# Patient Record
Sex: MALE | Race: WHITE | URBAN - METROPOLITAN AREA
[De-identification: names, ages, dates, MRNs, and addresses within clinical notes are randomized per-mention and may not be internally consistent; named-entity substitution may affect disease eponyms.]

---

## 2018-04-04 ENCOUNTER — TRANSFERRED RECORDS (OUTPATIENT)
Dept: HEALTH INFORMATION MANAGEMENT | Facility: CLINIC | Age: 23
End: 2018-04-04

## 2018-04-06 ENCOUNTER — TRANSFERRED RECORDS (OUTPATIENT)
Dept: HEALTH INFORMATION MANAGEMENT | Facility: CLINIC | Age: 23
End: 2018-04-06

## 2018-08-06 ENCOUNTER — OFFICE VISIT (OUTPATIENT)
Dept: SLEEP MEDICINE | Facility: CLINIC | Age: 23
End: 2018-08-06
Payer: COMMERCIAL

## 2018-08-06 VITALS
WEIGHT: 188 LBS | BODY MASS INDEX: 26.92 KG/M2 | RESPIRATION RATE: 16 BRPM | DIASTOLIC BLOOD PRESSURE: 85 MMHG | SYSTOLIC BLOOD PRESSURE: 135 MMHG | TEMPERATURE: 98.2 F | HEIGHT: 70 IN | HEART RATE: 60 BPM | OXYGEN SATURATION: 100 %

## 2018-08-06 DIAGNOSIS — G47.411 NARCOLEPSY WITH CATAPLEXY: Primary | ICD-10-CM

## 2018-08-06 PROCEDURE — 99204 OFFICE O/P NEW MOD 45 MIN: CPT | Performed by: INTERNAL MEDICINE

## 2018-08-06 RX ORDER — MODAFINIL 200 MG/1
200 TABLET ORAL DAILY
Qty: 30 TABLET | Refills: 2 | Status: SHIPPED | OUTPATIENT
Start: 2018-08-06 | End: 2018-09-06

## 2018-08-06 NOTE — NURSING NOTE
"Chief Complaint   Patient presents with     Sleep Problem     Establish care, MILAGROS       Initial /85  Pulse 60  Temp 98.2  F (36.8  C) (Oral)  Resp 16  Ht 1.778 m (5' 10\")  Wt 85.3 kg (188 lb)  SpO2 100%  BMI 26.98 kg/m2 Estimated body mass index is 26.98 kg/(m^2) as calculated from the following:    Height as of this encounter: 1.778 m (5' 10\").    Weight as of this encounter: 85.3 kg (188 lb).    Medication Reconciliation: complete    Neck circumference: 15 inches / 38 centimeters.    ESS 16    Aurea Dennis MA      "

## 2018-08-06 NOTE — MR AVS SNAPSHOT
After Visit Summary   8/6/2018    Fernando Tejeda    MRN: [id number]           Patient Information     Date Of Birth          1995        Visit Information        Provider Department      8/6/2018 3:30 PM Trent Van MD [facility]        Today's Diagnoses     Narcolepsy with cataplexy    -  1      Care Instructions      Your BMI is Body mass index is 26.98 kg/(m^2).  Weight management is a personal decision.  If you are interested in exploring weight loss strategies, the following discussion covers the approaches that may be successful. Body mass index (BMI) is one way to tell whether you are at a healthy weight, overweight, or obese. It measures your weight in relation to your height.  A BMI of 18.5 to 24.9 is in the healthy range. A person with a BMI of 25 to 29.9 is considered overweight, and someone with a BMI of 30 or greater is considered obese. More than two-thirds of American adults are considered overweight or obese.  Being overweight or obese increases the risk for further weight gain. Excess weight may lead to heart disease and diabetes.  Creating and following plans for healthy eating and physical activity may help you improve your health.  Weight control is part of healthy lifestyle and includes exercise, emotional health, and healthy eating habits. Careful eating habits lifelong are the mainstay of weight control. Though there are significant health benefits from weight loss, long-term weight loss with diet alone may be very difficult to achieve- studies show long-term success with dietary management in less than 10% of people. Attaining a healthy weight may be especially difficult to achieve in those with severe obesity. In some cases, medications, devices and surgical management might be considered.  What can you do?  If you are overweight or obese and are interested in methods for weight loss, you should discuss this with your provider.     Consider reducing  daily calorie intake by 500 calories.     Keep a food journal.     Avoiding skipping meals, consider cutting portions instead.    Diet combined with exercise helps maintain muscle while optimizing fat loss. Strength training is particularly important for building and maintaining muscle mass. Exercise helps reduce stress, increase energy, and improves fitness. Increasing exercise without diet control, however, may not burn enough calories to loose weight.       Start walking three days a week 10-20 minutes at a time    Work towards walking thirty minutes five days a week     Eventually, increase the speed of your walking for 1-2 minutes at time    In addition, we recommend that you review healthy lifestyles and methods for weight loss available through the National Institutes of Health patient information sites:  http://win.niddk.nih.gov/publications/index.htm    And look into health and wellness programs that may be available through your health insurance provider, employer, local community center, or evelia club.    Weight management plan: Patient was referred to their PCP to discuss a diet and exercise plan.              Follow-ups after your visit        Follow-up notes from your care team     Return in about 3 months (around 11/6/2018) for narcolepsy.      Your next 10 appointments already scheduled     Nov 05, 2018  3:00 PM CST   Return Sleep Patient with Ternt Van MD   [facility] ([facility])    [address] [place] [id number] [phone]              Who to contact     If you have questions or need follow up information about today's clinic visit or your schedule please contact [facility] directly at [phone].  Normal or non-critical lab and imaging results will be communicated to you by MyChart, letter or phone within 4 business days after the clinic has received the results. If you do not hear from us within 7 days,  "please contact the clinic through [employer] or phone. If you have a critical or abnormal lab result, we will notify you by phone as soon as possible.  Submit refill requests through [employer] or call your pharmacy and they will forward the refill request to us. Please allow 3 business days for your refill to be completed.          Additional Information About Your Visit        [employer] Information     [employer] lets you send messages to your doctor, view your test results, renew your prescriptions, schedule appointments and more. To sign up, go to [email or web address] . Click on \"Log in\" on the left side of the screen, which will take you to the Welcome page. Then click on \"Sign up Now\" on the right side of the page.     You will be asked to enter the access code listed below, as well as some personal information. Please follow the directions to create your username and password.     Your access code is: [email or web address]  Expires: 2018  3:13 PM     Your access code will  in 90 days. If you need help or a new code, please call your [place] clinic or [phone].        Care EveryWhere ID     This is your Care EveryWhere ID. This could be used by other organizations to access your [place] medical records  [id number]        Your Vitals Were     Pulse Temperature Respirations Height Pulse Oximetry BMI (Body Mass Index)    60 98.2  F (36.8  C) (Oral) 16 1.778 m (5' 10\") 100% 26.98 kg/m2       Blood Pressure from Last 3 Encounters:   18 135/85    Weight from Last 3 Encounters:   18 85.3 kg (188 lb)              Today, you had the following     No orders found for display         Today's Medication Changes          These changes are accurate as of 18  4:16 PM.  If you have any questions, ask your nurse or doctor.               Start taking these medicines.        Dose/Directions    modafinil 200 MG tablet   Commonly known as:  PROVIGIL   Used for:  Narcolepsy with cataplexy   Started by:  Rehan " MD Trent        Dose:  200 mg   Take 1 tablet (200 mg) by mouth daily   Quantity:  30 tablet   Refills:  2         Stop taking these medicines if you haven't already. Please contact your care team if you have questions.     METHYLPHENIDATE HCL PO   Stopped by:  Trent Van MD                Where to get your medicines      Some of these will need a paper prescription and others can be bought over the counter.  Ask your nurse if you have questions.     Bring a paper prescription for each of these medications     modafinil 200 MG tablet                Primary Care Provider Fax #    Physician No Ref-Primary [phone]       No address on file        Equal Access to Services     [facility]: Hadii linden davenport hadasho Sosasha, waaxda luqadaha, qaybta kaalmastacey chang, sunita yoder . So [facility] [phone].    ATENCIÓN: Si habla español, tiene a danielle disposición servicios gratuitos de asistencia lingüística. [facility] [phone].    We comply with applicable federal civil rights laws and [place] laws. We do not discriminate on the basis of race, color, national origin, age, disability, sex, sexual orientation, or gender identity.            Thank you!     Thank you for choosing [facility]  for your care. Our goal is always to provide you with excellent care. Hearing back from our patients is one way we can continue to improve our services. Please take a few minutes to complete the written survey that you may receive in the mail after your visit with us. Thank you!             Your Updated Medication List - Protect others around you: Learn how to safely use, store and throw away your medicines at www.disposemymeds.org.          This list is accurate as of 8/6/18  4:16 PM.  Always use your most recent med list.                   Brand Name Dispense Instructions for use Diagnosis    modafinil 200 MG tablet    PROVIGIL    30 tablet    Take 1 tablet (200 mg) by mouth daily     Narcolepsy with cataplexy

## 2018-08-06 NOTE — PROGRESS NOTES
Sleep Consultation Note:    Date on this visit: 8/6/2018    Fernando Tejeda is self referred for a sleep consultation regarding the need to establish care with [facility].    Primary Physician: Not yet established care    Fernando Tejeda is a 23 year old male without any significant PMH who is here to establish care for narcolepsy.     He had a recent PSG followed by a MSLT after having a consultation for hypersomnia since high school. Sleep time was extended at 515.5 minutes on the overnight polysomnography with all sleep stages represented. Sleep latency was normal at 3.5 minutes. REM latency essentially normal at 85 minutes. Sleep efficiency normal at 92%. RDI was slightly elevated at 10 events per hour, these primarily being in the supine position with a supine AHI at 10 events per hour, overall AHI 7/hr. Oxygen saturation noa was at 88%. EKG recording demonstrates normal sinus rhythm throughout.No substantial EMG abnormalities are seen. No definite EEG abnormalities were noted. Sleep architecture is notable for slight fragmentation.    Following the above MSLT was obtained. This was abnormal with a mean sleep latency on four naps at 3.1 minutes and four of the naps, i.e.: All 4 naps were noted to have sleep onset REM.    He was subsequently started on short acting ritalin 10 mg TID, of which he has only been taking 1 or 2 tablets at the maximum and even trying to avoid it on weekends. He tries to compensate with taking short 15 min naps prior to driving.     His primary reason for switching was the high financial bills he is getting. He had to pay high amount of facility payments for each of the clinic visits. He says that he also lives in [place], close to the [facility] sleep clinic.     As a child he was prescribed adderall for excessive sleepiness while in high school. He developed high anxiety and discontinued it in a month. He is currently experiencing similar side effects of anxiety with  the ritalin and wondering if could try something different. Denies any other palpitations/ chest pain/ head aches/ dizziness. from his last one month refill in June he has around 20 tables remaining.     Drinks minimal coffee throughout the day. Alcohol intake on weekends. Denies smoking/ drug abuse/ THC use. Works as in a ware house currently, [place], needs to drive around 60 miles per day. He denies being while driving but if he thinks he might be at risk of falling asleep, he either pulls over or asks a friend to take over or try to get a nap prior to driving.   Even as a teenager he used to fall asleep in classes/ while taking tests and was ridiculed about it. Fortunately, he reports he was able to complete psychology and is hoping to do masters in it as well. He is recently engaged and his fiancee is not too worried about his supine exclusive snoring. After the recent PSG he has been making a stronger effort to avoid back sleep.     He tries to maintain regular sleep schedules, bedtime on weekdays are from 9:30 pm to 6:30 am and on weekends as 11:30 pm till 8 am. He reports his sleep for unknown reason is very broken and he could tend to wake up multiple times per night but is able to fall asleep quickly as well. He takes short naps on the days he is avoiding to take ritalin.     Cataplexy: he reports of having mild entire body experience of weakness while laughing with friends or family.     Denies any hallucinations or sleep paralysis.     Denies any RLS. No other concerns for sleep walking/ talking or sleep eating. He does not complain of acting out his dreams.     Patient's [place] Sleepiness score 14/24 consistent with excessive  daytime sleepiness, even while taking ritalin on and off.      Allergies:    No Known Allergies    Medications:    Current Outpatient Prescriptions   Medication Sig Dispense Refill     modafinil (PROVIGIL) 200 MG tablet Take 1 tablet (200 mg) by mouth daily 30 tablet 2     Problem  "List:  There are no active problems to display for this patient.       Past Medical/Surgical History:  No past medical history on file.  No past surgical history on file.    Social History:  Social History     Social History     Marital status: Single     Spouse name: N/A     Number of children: N/A     Years of education: N/A     Occupational History     Not on file.     Social History Main Topics     Smoking status: Never Smoker     Smokeless tobacco: Never Used     Alcohol use Yes      Comment: weekends, bees/cocktails      Drug use: No     Sexual activity: Not on file     Other Topics Concern     Not on file     Social History Narrative     No narrative on file     Family History:  No family history on file.  Sleep family hx: no family hx of narcolepsy/ insomnia/ sudden cardiac deaths. Father has severe MILAGROS.     Review of Systems:  General: no fever, chills, weakness  HEENT: No loss of hearing, vertigo, ear ringing, double/blurry vision, sore throat, epistaxis  Pulmonary: No dyspnea, wheezing, cough, sputum, hemoptysis, chest pain  CVS: No chest discomfort, palpitations  GI: No diarrhea, constipation, dysphagia, early satiety, bleeding  Renal: No pain on urination, hematuria  Musculoskeletal: No muscle ache, joint pain, swelling  Skin: No rash, ecchymosis, itching, icterus  Neurology: No tingling, weakness, numbness  Heme: No easy bruisibility or bleeding  Allergy: runny nose, sneezing, urticeria  Psychiatry: no change in mood and affect, depression/ anxiety    Physical Examination:  Vitals: /85  Pulse 60  Temp 98.2  F (36.8  C) (Oral)  Resp 16  Ht 1.778 m (5' 10\")  Wt 85.3 kg (188 lb)  SpO2 100%  BMI 26.98 kg/m2  BMI= Body mass index is 26.98 kg/(m^2).         [place] Total Score 8/6/2018   Total score - [place] 16       General appearance: No apparent distress, well groomed.    HEENT:   Head: Normocephalic, atraumatic.  Eyes: PERRL  Nose: Nares  patent.  No exudate.  No septal deviation " noted.  Mouth: Teeth: Normal   Tongue: normal  Oropharynx:  Mallampati Classification: 3    Uvula: normal    Neck: Supple without bruit.     Neck Cir (cm): 38 cm (8/6/2018  3:21 PM)    Cardiac: Regular rate and rhythm.  No murmurs.  Radial pulses are strong and symmetric.  Pulmonary: Symmetric air movement, lungs clear to auscultation bilaterally.  Musculoskeletal: no edema noted.  No clubbing or cyanosis.  Skin: Warm, dry, intact.  Neurologic: Alert and oriented to person, place and time   Gait is normal.  Psychiatric: Affect normal.  Mood normal.    Impression/Plan:    Narcolepsy with cataplexy:   Fernando Tejeda is a 23 year old male without any significant PMH who is here to establish care for narcolepsy with cataplexy.   -- per request we will change the stimulant medications. Modafinil might be a better a better choice for long term management of his pathologic hypersomnia considering the lower side effect profile when compared to the traditional stimulants like methylphenidate or even the longer acting concerta  -- start provigil 200 mg, he will start with 100 mg in the morning and increase to 200 mg if necessary.   -- at this point of time his cataplexy is not affecting his daily functioning and we are not concerned about treating it separately  -- on the days that he is avoiding to take any medications, advised to take short naps as needed especially before driving.      Snoring  Mild supine MILAGROS, AHI:7/hr. Pt given instructions to avoid back sleep with zzoma pillows/ sleep shift positioner/ home made tennis ball t-shirt.   Encourage to maintain a healthy wt, healthy diet, and exercise. maintaining a healthy wt can prevent his mild MILAGROS from progressing.     Patient was strongly advised to avoid driving, operating any heavy machinery or other hazardous situations while drowsy or sleepy.  Patient was counseled on the importance of driving while alert, to pull over if drowsy, or nap before getting into the  vehicle if sleepy.      Seen and examined with Dr. Rehan Boyd MD  Sleep medicine fellow    Physician Attestation   I, Trent Van MD, saw this patient with thefellow and agree with the fellow's findings and plan of care as documented in the note.        Trent Van MD, MD  Date of Service (when I saw the patient): Aug 6, 2018

## 2018-08-06 NOTE — PATIENT INSTRUCTIONS

## 2018-08-07 ENCOUNTER — TELEPHONE (OUTPATIENT)
Dept: SLEEP MEDICINE | Facility: CLINIC | Age: 23
End: 2018-08-07

## 2018-08-08 ENCOUNTER — TELEPHONE (OUTPATIENT)
Dept: SLEEP MEDICINE | Facility: CLINIC | Age: 23
End: 2018-08-08

## 2018-08-09 NOTE — TELEPHONE ENCOUNTER
Prior authorization needed for Modafinl.  Called and initiated authorization.    Approval given on 8/8/2018 for 6 months. Patient and pharmacy notified.     Diana Waters  Sleep Clinic - Specialist

## 2018-09-06 ENCOUNTER — MYC REFILL (OUTPATIENT)
Dept: SLEEP MEDICINE | Facility: CLINIC | Age: 23
End: 2018-09-06

## 2018-09-06 DIAGNOSIS — G47.411 NARCOLEPSY WITH CATAPLEXY: ICD-10-CM

## 2018-09-07 RX ORDER — MODAFINIL 200 MG/1
200 TABLET ORAL DAILY
Qty: 30 TABLET | Refills: 2 | Status: SHIPPED | OUTPATIENT
Start: 2018-09-07 | End: 2018-10-14

## 2018-09-07 NOTE — TELEPHONE ENCOUNTER
Message from MyChart:  Original authorizing provider: Trent Van MD, MD eFrnando Tejeda would like a refill of the following medications:  modafinil (PROVIGIL) 200 MG tablet [Trent Van MD, MD]    Preferred pharmacy: [facility]    Comment:

## 2018-10-14 ENCOUNTER — MYC REFILL (OUTPATIENT)
Dept: SLEEP MEDICINE | Facility: CLINIC | Age: 23
End: 2018-10-14

## 2018-10-14 DIAGNOSIS — G47.411 NARCOLEPSY WITH CATAPLEXY: ICD-10-CM

## 2018-10-15 RX ORDER — MODAFINIL 200 MG/1
200 TABLET ORAL DAILY
Qty: 30 TABLET | Refills: 2 | Status: SHIPPED | OUTPATIENT
Start: 2018-10-15 | End: 2019-04-16

## 2018-10-15 NOTE — TELEPHONE ENCOUNTER
Message from [place]:  Original authorizing provider: Trent Van MD, MD Fernando Robbinson would like a refill of the following medications:  modafinil (PROVIGIL) 200 MG tablet [Trent Van MD, MD]    Preferred pharmacy: [facility]    Comment:  [place] [address]

## 2018-11-05 ENCOUNTER — OFFICE VISIT (OUTPATIENT)
Dept: SLEEP MEDICINE | Facility: CLINIC | Age: 23
End: 2018-11-05
Payer: COMMERCIAL

## 2018-11-05 VITALS
HEART RATE: 76 BPM | SYSTOLIC BLOOD PRESSURE: 134 MMHG | WEIGHT: 187 LBS | BODY MASS INDEX: 26.77 KG/M2 | RESPIRATION RATE: 16 BRPM | OXYGEN SATURATION: 96 % | HEIGHT: 70 IN | DIASTOLIC BLOOD PRESSURE: 91 MMHG

## 2018-11-05 DIAGNOSIS — G47.411 NARCOLEPSY WITH CATAPLEXY: ICD-10-CM

## 2018-11-05 PROBLEM — G47.419 NARCOLEPSY WITHOUT CATAPLEXY(347.00): Status: ACTIVE | Noted: 2018-11-05

## 2018-11-05 PROCEDURE — 99213 OFFICE O/P EST LOW 20 MIN: CPT | Performed by: INTERNAL MEDICINE

## 2018-11-05 NOTE — PROGRESS NOTES
"  Chief Complaint   Patient presents with     Follow up of narcolepsy     Medication follow up        Fernando Tejeda comes in today for follow-up of narcolepsy with cataplexy managed with Modafinil.      PSG and MSLT were performed through [facility] in April 2018.Sleep time was extended at 515.5 minutes on the overnight polysomnography with all sleep stages represented. REM latency essentially normal at 85 minutes. Sleep efficiency normal at 92%. RDI was slightly elevated at 10 events per hour, these primarily being in the supine position with a supine AHI at 10 events per hour, overall AHI 7/hr. Oxygen saturation noa was at 88%.     Following the above MSLT was obtained. This was abnormal with a mean sleep latency on four naps at 3.1 minutes and four of the naps, i.e.: All 4 naps were noted to have sleep onset REM.     Overall, patient is doing well on Modafinil 200 mg in the morning. He has adequate alertness through out the day. He notices some sleepiness when he returns home after work but is not concerned about this.     He has not experienced nay side effects from Modafinil.     Bedtime is typically 10 pm. Usually it takes about 20 minutes to fall asleep with the mask on. Wake time is typically 6:30 am.     He does feel rested in the morning.    Total score - [place]: 7 (11/5/2018  3:08 PM)    Reviewed by team: Tobacco  Allergies  Meds       Reviewed by provider:        Problem List:  There are no active problems to display for this patient.         BP (!) 134/91  Pulse 76  Resp 16  Ht 1.778 m (5' 10\")  Wt 84.8 kg (187 lb)  SpO2 96%  BMI 26.83 kg/m2    Impression/Plan:     1. Narcolepsy with cataplexy     - Patient is doing well on Modafinil 200 mg daily. Will continue current dose.     - Will continue to monitor blood pressure.       Fernando Tejeda will follow up in about 6 month(s).     Fifteen minutes spent with patient, all of which were spent face-to-face counseling, consulting, " coordinating plan of care.      Trent Van MD, MD

## 2018-11-05 NOTE — PATIENT INSTRUCTIONS

## 2019-03-08 ENCOUNTER — TELEPHONE (OUTPATIENT)
Dept: SLEEP MEDICINE | Facility: CLINIC | Age: 24
End: 2019-03-08

## 2019-03-08 NOTE — TELEPHONE ENCOUNTER
Left voice mail for patient to reschedule his six month follow-up with Dr. Van as Dr. Van is out of the office on 05/06/19.      Ryann [place]

## 2019-04-16 ENCOUNTER — MYC REFILL (OUTPATIENT)
Dept: OTHER | Age: 24
End: 2019-04-16

## 2019-04-16 DIAGNOSIS — G47.411 NARCOLEPSY WITH CATAPLEXY: ICD-10-CM

## 2019-04-16 RX ORDER — MODAFINIL 200 MG/1
200 TABLET ORAL DAILY
Qty: 30 TABLET | Refills: 5 | Status: SHIPPED | OUTPATIENT
Start: 2019-04-16 | End: 2019-05-30

## 2019-04-16 RX ORDER — MODAFINIL 200 MG/1
200 TABLET ORAL DAILY
Qty: 30 TABLET | Refills: 2 | Status: SHIPPED | OUTPATIENT
Start: 2019-04-16 | End: 2019-04-16

## 2019-04-16 RX ORDER — MODAFINIL 200 MG/1
200 TABLET ORAL DAILY
Qty: 30 TABLET | Refills: 5 | Status: CANCELLED | OUTPATIENT
Start: 2019-04-16

## 2019-05-30 DIAGNOSIS — G47.411 NARCOLEPSY WITH CATAPLEXY: ICD-10-CM

## 2019-05-30 RX ORDER — MODAFINIL 200 MG/1
200 TABLET ORAL DAILY
Qty: 30 TABLET | Refills: 5 | Status: SHIPPED | OUTPATIENT
Start: 2019-05-30 | End: 2021-02-02

## 2019-05-30 NOTE — TELEPHONE ENCOUNTER
----- Message from Trent Van MD sent at 5/30/2019  1:26 PM CDT -----  Regarding: appt  Hi,    Can you arrange a clinic follow up for this patient.     Thanks

## 2019-06-10 DIAGNOSIS — G47.411 NARCOLEPSY WITH CATAPLEXY: ICD-10-CM

## 2020-03-11 ENCOUNTER — HEALTH MAINTENANCE LETTER (OUTPATIENT)
Age: 25
End: 2020-03-11

## 2020-12-04 VITALS — WEIGHT: 205 LBS | HEIGHT: 70 IN | BODY MASS INDEX: 29.35 KG/M2

## 2020-12-04 ASSESSMENT — MIFFLIN-ST. JEOR: SCORE: 1916.12

## 2020-12-04 NOTE — PATIENT INSTRUCTIONS
Your BMI is Body mass index is 29.41 kg/m .  Weight management is a personal decision.  If you are interested in exploring weight loss strategies, the following discussion covers the approaches that may be successful. Body mass index (BMI) is one way to tell whether you are at a healthy weight, overweight, or obese. It measures your weight in relation to your height.  A BMI of 18.5 to 24.9 is in the healthy range. A person with a BMI of 25 to 29.9 is considered overweight, and someone with a BMI of 30 or greater is considered obese. More than two-thirds of American adults are considered overweight or obese.  Being overweight or obese increases the risk for further weight gain. Excess weight may lead to heart disease and diabetes.  Creating and following plans for healthy eating and physical activity may help you improve your health.  Weight control is part of healthy lifestyle and includes exercise, emotional health, and healthy eating habits. Careful eating habits lifelong are the mainstay of weight control. Though there are significant health benefits from weight loss, long-term weight loss with diet alone may be very difficult to achieve- studies show long-term success with dietary management in less than 10% of people. Attaining a healthy weight may be especially difficult to achieve in those with severe obesity. In some cases, medications, devices and surgical management might be considered.  What can you do?  If you are overweight or obese and are interested in methods for weight loss, you should discuss this with your provider.     Consider reducing daily calorie intake by 500 calories.     Keep a food journal.     Avoiding skipping meals, consider cutting portions instead.    Diet combined with exercise helps maintain muscle while optimizing fat loss. Strength training is particularly important for building and maintaining muscle mass. Exercise helps reduce stress, increase energy, and improves fitness.  Increasing exercise without diet control, however, may not burn enough calories to loose weight.       Start walking three days a week 10-20 minutes at a time    Work towards walking thirty minutes five days a week     Eventually, increase the speed of your walking for 1-2 minutes at time    In addition, we recommend that you review healthy lifestyles and methods for weight loss available through the National Institutes of Health patient information sites:  http://win.niddk.nih.gov/publications/index.htm    And look into health and wellness programs that may be available through your health insurance provider, employer, local community center, or [employer]elia club.    Weight management plan: Patient was referred to their PCP to discuss a diet and exercise plan.   Equipment Instructions    We will process your PAP order and send it to a Durable Medical Equipment (DME) provider.    The medical equipment company should call you within 7 days.  If you have not heard from the company, please contact them to see if they received your order and are planning to call you.    Please call us at [phone] if you are unable to contact the medical equipment company or if they do not have the order.    If you are starting a new PAP machine, please call us after you use it the first night to let us know how it went. This call also helps us know that you received your equipment and that everything is ready. Please use our central phone number [phone]    Contact information for [employer] company:    [email or web address] Tel: [phone]

## 2020-12-04 NOTE — PROGRESS NOTES
"Fernando Tejeda is a 25 year old male who is being evaluated via a billable video visit.      The patient has been notified of following:     \"This video visit will be conducted via a call between you and your physician/provider. We have found that certain health care needs can be provided without the need for an in-person physical exam.  This service lets us provide the care you need with a video conversation.  If a prescription is necessary we can send it directly to your pharmacy.  If lab work is needed we can place an order for that and you can then stop by our lab to have the test done at a later time.    Video visits are billed at different rates depending on your insurance coverage.  Please reach out to your insurance provider with any questions.    If during the course of the call the physician/provider feels a video visit is not appropriate, you will not be charged for this service.\"    Patient has given verbal consent for Video visit? Yes  How would you like to obtain your AVS? MyChart  If you are dropped from the video visit, the video invite should be resent to: Text to cell phone: [phone]  Will anyone else be joining your video visit? No    Video-Visit Details    Type of service:  Video Visit    Originating Location (pt. Location): Home    Distant Location (provider location):  [facility] SLEEP [facility]     Platform used for Video Visit: Toura    Thank you for the opportunity to participate in the care of Fernando Tejeda.     He is a 25 year old y/o male patient who comes to the sleep medicine clinic for follow up.  The patient was diagnosed with mild obstructive sleep apnea as well as narcolepsy with cataplexy in April 2018 from [facility].  He was initially started on Ritalin and switch over to modafinill.  The patient states that he discontinued modafinil on his own about a year ago due to worsening blood pressure and headaches.  The patient wanted to establish care with me " to get a second opinion as to how to proceed in treating his symptoms.  He still suffers from occasional episodes of sleep attacks that he tries to fight through with sheer will power.  He also would try to compensate by talking with friends on the phone while driving.  I advised the patient to avoid drowsy driving at all costs.     Assessment and Plan:  In summary Fernando Tejeda is a 25 year old year old male who is here for transfer of care.    1. Obstructive sleep apnea  I recommend that we consider treating his mild obstructive sleep apnea to see if this will decrease his frequent nocturnal awakenings at night and improve his sleep quality.  The patient agreed.  He like to use [employer] at the [employer].  - COMPREHENSIVE DME    2. Narcolepsy with cataplexy  I advised the patient to contact his primary care doctor discussed the possibility of being treated for high blood pressure.  I cannot initiate any stimulant medication without his blood pressure being addressed.    3. Hypersomnia  - COMPREHENSIVE DME    Sleep-Wake Cycle:    The patient likes to initiate sleep at around 9:30 PM-10 PM with a sleep latency of 5 minutes. The patient has 20 nocturnal awakenings. Final wake up time is around 7 AM.      Past Medical History:   Diagnosis Date     MILAGROS (obstructive sleep apnea)      Primary narcolepsy with cataplexy        History reviewed. No pertinent surgical history.    Social History     Socioeconomic History     Marital status:      Spouse name: Not on file     Number of children: Not on file     Years of education: Not on file     Highest education level: Not on file   Occupational History     Not on file   Social Needs     Financial resource strain: Not on file     Food insecurity     Worry: Not on file     Inability: Not on file     Transportation needs     Medical: Not on file     Non-medical: Not on file   Tobacco Use     Smoking status: Never Smoker     Smokeless  tobacco: Never Used   Substance and Sexual Activity     Alcohol use: Yes     Comment: weekends, bees/cocktails      Drug use: No     Sexual activity: Not on file   Lifestyle     Physical activity     Days per week: Not on file     Minutes per session: Not on file     Stress: Not on file   Relationships     Social connections     Talks on phone: Not on file     Gets together: Not on file     Attends Yazidi service: Not on file     Active member of club or organization: Not on file     Attends meetings of clubs or organizations: Not on file     Relationship status: Not on file     Intimate partner violence     Fear of current or ex partner: Not on file     Emotionally abused: Not on file     Physically abused: Not on file     Forced sexual activity: Not on file   Other Topics Concern     Parent/sibling w/ CABG, MI or angioplasty before 65F 55M? Not Asked   Social History Narrative     Not on file       Current Outpatient Medications   Medication Sig Dispense Refill     modafinil (PROVIGIL) 200 MG tablet Take 1 tablet (200 mg) by mouth daily 30 tablet 5       No Known Allergies    No flowsheet data found.    Physical Exam:  GEN: NAD,  Psych: normal mood, normal affect    Labs/Studies:    I reviewed the efficacy and compliance report from his device. Data summarized on the HPI and the PAP compliance flow sheet.        Patient verbalized understanding of these issues, agrees with the plan and all questions were answered today. Patient was given an opportuntity to voice any other symptoms or concerns not listed above. Patient did not have any other symptoms or concerns.      Hermes Heller DO  Board Certified in Internal Medicine and Sleep Medicine    (Note created with Dragon voice recognition and unintended spelling errors and word substitutions may occur)     I spent a total of 25 minutes of face-to-face encounter and in preparation for this clinic visit.    Audio and visual devices were used for this virtual  clinic visit with permission from patient.

## 2020-12-07 ENCOUNTER — VIRTUAL VISIT (OUTPATIENT)
Dept: SLEEP MEDICINE | Facility: CLINIC | Age: 25
End: 2020-12-07
Payer: COMMERCIAL

## 2020-12-07 ENCOUNTER — TELEPHONE (OUTPATIENT)
Dept: SLEEP MEDICINE | Facility: CLINIC | Age: 25
End: 2020-12-07

## 2020-12-07 DIAGNOSIS — G47.33 OBSTRUCTIVE SLEEP APNEA: Primary | ICD-10-CM

## 2020-12-07 DIAGNOSIS — G47.10 HYPERSOMNIA: ICD-10-CM

## 2020-12-07 DIAGNOSIS — G47.411 NARCOLEPSY WITH CATAPLEXY: ICD-10-CM

## 2020-12-07 PROCEDURE — 99214 OFFICE O/P EST MOD 30 MIN: CPT | Mod: GT | Performed by: INTERNAL MEDICINE

## 2020-12-15 ENCOUNTER — DOCUMENTATION ONLY (OUTPATIENT)
Dept: SLEEP MEDICINE | Facility: CLINIC | Age: 25
End: 2020-12-15

## 2020-12-15 DIAGNOSIS — G47.411 NARCOLEPSY WITH CATAPLEXY: ICD-10-CM

## 2020-12-15 NOTE — PROGRESS NOTES
12/ - JL- PT SCHEDULED FOR [facility]ACHARY ON 12/17/2020 HE IS GOING TO REVIEW MACHINES AND MASK AND THEN CALL [phone] TODAY OR TOMORROW TO UPDATE WHAT MACHINE AND NASAL MASKS HE WANTS TO GET.  CLEARED FOR COVID-19 AND ADDRESS VERIFIED WITH PT.

## 2020-12-21 ENCOUNTER — DOCUMENTATION ONLY (OUTPATIENT)
Dept: SLEEP MEDICINE | Facility: CLINIC | Age: 25
End: 2020-12-21

## 2020-12-21 ENCOUNTER — APPOINTMENT (OUTPATIENT)
Dept: SLEEP MEDICINE | Facility: CLINIC | Age: 25
End: 2020-12-21
Payer: COMMERCIAL

## 2020-12-21 DIAGNOSIS — G47.411 NARCOLEPSY WITH CATAPLEXY: ICD-10-CM

## 2020-12-21 NOTE — PROGRESS NOTES
Patient was offered choice of vendor and chose [facility].  Patient Fernando Tejeda was set up at [facility]  on December 21, 2020. Patient received a Resmed AirSense 10 Auto. Pressures were set at 5-15 cm H2O.   Patient s ramp is 5 cm H2O for Auto and FLEX/EPR is EPR, 2.  Patient received a Resmed Mask name: Airfit N30i Nasal mask size Standard, heated tubing and heated humidifier.  Patient does not need to meet compliance. Patient has a follow up on 2/3/2021 with Dr. Heller.    Juliet Samuels

## 2020-12-28 ENCOUNTER — DOCUMENTATION ONLY (OUTPATIENT)
Dept: SLEEP MEDICINE | Facility: CLINIC | Age: 25
End: 2020-12-28

## 2020-12-28 DIAGNOSIS — G47.411 NARCOLEPSY WITH CATAPLEXY: ICD-10-CM

## 2020-12-28 NOTE — PROGRESS NOTES
3 DAY STM VISIT    Diagnostic AHI: 7  PSG    Patient contacted for 3 day STM visit    Confirmed with patient at time of call- Yes Patient is still interested in STM service.     Subjective measures:  Patient having trouble with air pressure.    Replacement device: No  STM ordered by provider: Yes     Device type: Auto-CPAP  PAP settings from order::  CPAP min 5 cm  H20       CPAP max 15 cm  H20  Mask type:    Nasal Mask     Device settings from machine      Min CPAP 5.0            Max CPAP 15.0          EPR level Setting: TWO      RESMED Soft response setting:  OFF  Assessment: Nightly usage, most nights under four hours. Changed patient machine response to soft and EPR to 3.   Action plan: Patient to have 14 day STM visit. Patient has a follow up visit scheduled:   yes within 31-90 days of set up.     Total time spent on accessing and  interpreting remote patient PAP therapy data  10 minutes  Total time spent counseling, coaching  and reviewing PAP therapy data with patient  3 minutes.  [id number] no            HPI      ROS      Physical Exam

## 2021-01-03 ENCOUNTER — HEALTH MAINTENANCE LETTER (OUTPATIENT)
Age: 26
End: 2021-01-03

## 2021-01-05 ENCOUNTER — DOCUMENTATION ONLY (OUTPATIENT)
Dept: SLEEP MEDICINE | Facility: CLINIC | Age: 26
End: 2021-01-05

## 2021-01-05 DIAGNOSIS — G47.411 NARCOLEPSY WITH CATAPLEXY: ICD-10-CM

## 2021-01-05 NOTE — PROGRESS NOTES
14  DAY STM VISIT    Diagnostic AHI: 7  PSG    Subjective measures:   Patient not currently using his CPAP he is living at his mother in laws and doesn't have access to safe cleaning water he doesn't want to damage his machine until his house is fixed.     Assessment: Pt not meeting objective benchmarks for compliance.    Action plan: pt to have 30 day STM visit.      Device type: Auto-CPAP    PAP settings: CPAP min 5.0 cm  H20       CPAP max 15.0 cm  H20      95th% pressure 9.2 cm  H20        RESMED EPR level Setting: THREE    RESMED Soft response setting:  ON    Mask type:  Nasal Mask    Objective measures: 14 day rolling measures      Compliance  0 %      Leak  7.8  lpm  last  upload      AHI 5.56   last  upload      Average number of minutes 48      Objective measure goal  Compliance   Goal >70%  Leak   Goal < 24 lpm  AHI  Goal < 5  Usage  Goal >240        Total time spent on accessing and interpreting remote patient PAP therapy data  10 minutes    Total time spent counseling, coaching  and reviewing PAP therapy data with patient  1 minutes    [id number]  [id number]  no (3 day STM)

## 2021-02-01 ENCOUNTER — DOCUMENTATION ONLY (OUTPATIENT)
Dept: SLEEP MEDICINE | Facility: CLINIC | Age: 26
End: 2021-02-01

## 2021-02-01 DIAGNOSIS — G47.411 NARCOLEPSY WITH CATAPLEXY: ICD-10-CM

## 2021-02-02 VITALS — HEIGHT: 70 IN | BODY MASS INDEX: 29.35 KG/M2 | WEIGHT: 205 LBS

## 2021-02-02 ASSESSMENT — MIFFLIN-ST. JEOR: SCORE: 1916.12

## 2021-02-02 NOTE — PROGRESS NOTES
30 DAY [facility] VISIT    Diagnostic AHI: 7    PSG    Subjective measures:  Patient reports that he is having issues with mask fit.  He is thinking about changing to a dental device and has follow up tomorrow to discuss options.     Assessment: Pt not meeting objective benchmarks for  compliance     Action plan:   Patient has scheduled a follow up visit with Dr. Heller on 2/3/21.   Device type: Auto-CPAP  PAP settings: CPAP min 5.0 cm  H20     CPAP max 15.0 cm  H20        95th% pressure 5.5 cm  H20      RESMED EPR level Setting: THREE    RESMED Soft response setting:  ON  Mask type:  Nasal Mask  Objective measures: 14 day rolling measures      Compliance  0 %      Leak  6.6 lpm  last  upload      AHI 3.8   last  upload      Average number of minutes 10      Objective measure goal  Compliance   Goal >70%  Leak   Goal < 24 lpm  AHI  Goal < 5  Usage  Goal >240        Total time spent on accessing and interpreting remote patient PAP therapy data  3 minutes    Total time spent counseling, coaching  and reviewing PAP therapy data with patient  3 minutes     [id number] this call  [id number] no  at 3 or 14 day [facility]

## 2021-02-02 NOTE — PROGRESS NOTES
Tera is a 25 year old who is being evaluated via a billable video visit.      How would you like to obtain your AVS? MyChart  If the video visit is dropped, the invitation should be resent by: Text to cell phone: [phone]  Will anyone else be joining your video visit? No    Video-Visit Details    Type of service:  Video Visit    Originating Location (pt. Location): Home    Distant Location (provider location):  [facility] SLEEP [facility]     Platform used for Video Visit: NextCare    Thank you for the opportunity to participate in the care of Fernando Tejeda.     He is a 25 year old y/o male patient who comes to the sleep medicine clinic for follow up.  The patient states that since the last clinical visit with me, he did initiate CPAP but he was not able to tolerate it because it bothered his nose and he found himself tossing and turning all night long and didn't get any sleep. He intends to quite CPAP usage and return the machine. Instead he would like to explore the option of getting oral appliances. His primary care provider has not decided to proceed with anti-HTN medications as of yet. I informed him that I cannot initiate stimulant medications if he is not on anti-HTN medications.     Assessment and Plan:  In summary Fernando Tejeda is a 25 year old year old male who is here for follow up .    1. Narcolepsy with cataplexy  Once the patient starts anti-HTN medications, I may consider initiating him on provigil or nuvigil.    2. Obstructive sleep apnea  I will give the patient a referral to see a dentist who performs oral appliances.  - SLEEP DENTAL REFERRAL          1/28/2021   (Retired) % compliance greater than four hours accumulative 0   % compliance greater than four hours rolling average 14 days 0   Time mask on face 14 day average 10   Time mask on face 30 day average 5   Mask Removal Events 8  Number of mask on/off events   Usage 142  Total usage duration in minutes   95% of  leak in LPM (ResMed) 13.2   95% OF Leak in litres Rolling Average 14 Days 6.6   95% OF Leak in litres Rolling Average 30 Days 6.6   Minimum CPAP/ASV Pressure setting 5.0  Minimum allowable pressure in cmH2O   Maximum CPAP/ASV Pressure setting 15.0  Maximum allowable pressure in cmH2O   IPAP 95% 10.92  95% of target IPAP in cmH2O   Target IPAP (95% of Target) 14 day average (Resmed) 5.5   Target IPAP (95% of Target) 30 day average (Resmed) 5.5       Patient Active Problem List   Diagnosis     Narcolepsy with cataplexy       Past Medical History:   Diagnosis Date     MILAGROS (obstructive sleep apnea)      Primary narcolepsy with cataplexy        No past surgical history on file.    Social History     Socioeconomic History     Marital status:      Spouse name: Not on file     Number of children: Not on file     Years of education: Not on file     Highest education level: Not on file   Occupational History     Not on file   Social Needs     Financial resource strain: Not on file     Food insecurity     Worry: Not on file     Inability: Not on file     Transportation needs     Medical: Not on file     Non-medical: Not on file   Tobacco Use     Smoking status: Never Smoker     Smokeless tobacco: Never Used   Substance and Sexual Activity     Alcohol use: Yes     Comment: weekends, bees/cocktails      Drug use: No     Sexual activity: Not on file   Lifestyle     Physical activity     Days per week: Not on file     Minutes per session: Not on file     Stress: Not on file   Relationships     Social connections     Talks on phone: Not on file     Gets together: Not on file     Attends Evangelical service: Not on file     Active member of club or organization: Not on file     Attends meetings of clubs or organizations: Not on file     Relationship status: Not on file     Intimate partner violence     Fear of current or ex partner: Not on file     Emotionally abused: Not on file     Physically abused: Not on file     Forced  sexual activity: Not on file   Other Topics Concern     Parent/sibling w/ CABG, MI or angioplasty before 65F 55M? Not Asked   Social History Narrative     Not on file       No current outpatient medications on file.       Allergies   Allergen Reactions     Seasonal Allergies Itching       Physical Exam:  GEN: NAD,  Psych: normal mood, normal affect    Labs/Studies:    I reviewed the efficacy and compliance report from his device. Data summarized on the HPI and the PAP compliance flow sheet.     Patient verbalized understanding of these issues, agrees with the plan and all questions were answered today. Patient was given an opportuntity to voice any other symptoms or concerns not listed above. Patient did not have any other symptoms or concerns.      Hermes Heller DO  Board Certified in Internal Medicine and Sleep Medicine    (Note created with Dragon voice recognition and unintended spelling errors and word substitutions may occur)     I spent a total of 20 minutes of face-to-face encounter and in preparation for this clinic visit.    Audio and visual devices were used for this virtual clinic visit with permission from patient.

## 2021-02-03 ENCOUNTER — VIRTUAL VISIT (OUTPATIENT)
Dept: SLEEP MEDICINE | Facility: CLINIC | Age: 26
End: 2021-02-03
Payer: COMMERCIAL

## 2021-02-03 DIAGNOSIS — G47.33 OBSTRUCTIVE SLEEP APNEA: Primary | ICD-10-CM

## 2021-02-03 DIAGNOSIS — G47.411 NARCOLEPSY WITH CATAPLEXY: ICD-10-CM

## 2021-02-03 PROCEDURE — 99213 OFFICE O/P EST LOW 20 MIN: CPT | Mod: GT | Performed by: INTERNAL MEDICINE

## 2021-02-03 NOTE — PATIENT INSTRUCTIONS
[facility]   Sleep Medicine [facility]   Kirby Van DDS, MS   [facility]  [address] [place] [address] [address]  [place] [address]   Appointments: [phone]  Fax: [phone]   Email: [email or web address]   Dental and Oral Surgery Clinic   Nagi Hernandez, ANA Govea DDS   [address]   [place] [address]   Appointments: [phone]   Website: [email or web address]    Snoring and Sleep Apnea   [facility]   DANA Alvarado DDS  [address]   [place] [address]   Appointments: [phone]   Fax: [phone]   Email: [email or web address]  Website: [employer]     [facility]   Office 1   Carlo Wing DDS - [DME Medicare]  [address], [address]   [place] [address]  Appointments: [phone]  Fax: [phone]  Website: [employer]    [facility]   Office 2  Carlo Wing DDS - [DME Medicare]  [address]  [place] [address]  Appointments: [phone]  Fax: [phone]  Website: [employer]    [facility]  Tremaine Gaffney DDS  [address] [place] [address]  Appointments: [phone]    [facility]   [place] Office   Dirk Govea DDS   [employer]   [address]   [place] [address]   Appointments: [phone]   Fax: [phone]   Website: [employer]     [facility]   [place] Office   Esteban Padilla DDS, MS - [DME Medicare]  [facility] [address], [address]   [place] [address]   Appointments: [phone]   Fax: [phone]   Website: [employer]     [employer] [facility]iromi Gillespie DMD, MSD - [DME Medicare]  [address]  [place]  [address]  Appointments [phone]  Fax: [phone]  Website: [employer]    [facility]   [place] Office   Lashaun Cowan DDS, PhD, [facility]   [address], [address]   [place] [address]   Appointments: [phone]   Fax: [phone]   Website: [employer]     [facility]   [place] Office   Lashaun Cowan DDS, PhD, MS   [facility]   [address]   [place] [address]   Appointments: [phone]   Fax: [phone]   Website: [employer]

## 2021-03-24 ENCOUNTER — DOCUMENTATION ONLY (OUTPATIENT)
Dept: HOME HEALTH SERVICES | Facility: CLINIC | Age: 26
End: 2021-03-24

## 2021-04-07 ENCOUNTER — TELEPHONE (OUTPATIENT)
Dept: SLEEP MEDICINE | Facility: CLINIC | Age: 26
End: 2021-04-07

## 2021-04-07 NOTE — TELEPHONE ENCOUNTER
Reason for call:  Other   Patient called regarding (reason for call): pt needs sleep referral from Doctor Pina faxed over to [facility]     Additional comments: please fax over to [phone] asap       Phone number to reach patient:  Cell number on file:    Telephone Information:   Mobile [phone]       Best Time:  Anytime     Can we leave a detailed message on this number?  YES    Travel screening: Not Applicable

## 2021-04-25 ENCOUNTER — HEALTH MAINTENANCE LETTER (OUTPATIENT)
Age: 26
End: 2021-04-25

## 2021-04-26 ENCOUNTER — TRANSFERRED RECORDS (OUTPATIENT)
Dept: HEALTH INFORMATION MANAGEMENT | Facility: CLINIC | Age: 26
End: 2021-04-26

## 2021-04-26 NOTE — TELEPHONE ENCOUNTER
Dental device referral, and sleep study record have been faxed to [facility]: F) [phone] today.     A copy of each has been mailed to patient.     Alyssa HOLLIS CMA, [facility], 4/26/2021 2:56 PM

## 2021-07-06 ENCOUNTER — DOCUMENTATION ONLY (OUTPATIENT)
Dept: SLEEP MEDICINE | Facility: CLINIC | Age: 26
End: 2021-07-06

## 2021-07-06 DIAGNOSIS — G47.411 NARCOLEPSY WITH CATAPLEXY: ICD-10-CM

## 2021-10-10 ENCOUNTER — HEALTH MAINTENANCE LETTER (OUTPATIENT)
Age: 26
End: 2021-10-10

## 2022-05-21 ENCOUNTER — HEALTH MAINTENANCE LETTER (OUTPATIENT)
Age: 27
End: 2022-05-21

## 2022-09-18 ENCOUNTER — HEALTH MAINTENANCE LETTER (OUTPATIENT)
Age: 27
End: 2022-09-18

## 2023-06-04 ENCOUNTER — HEALTH MAINTENANCE LETTER (OUTPATIENT)
Age: 28
End: 2023-06-04

## 2024-07-14 ENCOUNTER — HEALTH MAINTENANCE LETTER (OUTPATIENT)
Age: 29
End: 2024-07-14